# Patient Record
(demographics unavailable — no encounter records)

---

## 2025-03-31 NOTE — CONSULT LETTER
[Dear  ___] : Dear  [unfilled], [Consult Letter:] : I had the pleasure of evaluating your patient, [unfilled]. [Please see my note below.] : Please see my note below. [Consult Closing:] : Thank you very much for allowing me to participate in the care of this patient.  If you have any questions, please do not hesitate to contact me. [Sincerely,] : Sincerely, [FreeTextEntry2] : Dr Juan M Ferrell\par  [address]\par  [facility] [address]\par  tel: [phone] [FreeTextEntry3] : Ira Knutson MD, MS\par  Attending Physician, Pediatric Rheumatology\par

## 2025-03-31 NOTE — HISTORY OF PRESENT ILLNESS
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  XR at the time of the affected toe and finger did not reveal erosions. failed naprosyn x2 and underwent IAC injections with sedation to R 5th MCP, L2nd and 4th MTP, L 5 th MTP, bilateral knees in 11/2018. s/p IAC injections 7/2020 to R 2nd and 4th MTP, L 5th MTP.  Today they are here for follow-up appointment and full joint exam. No complaints and Anastasia has been very active - in dance and sports, not so much gymnastics this year. Fully participates in dance class year round. No joint pain or swelling. No morning stiffness.  Last saw ophth 8/2024- no uveitis. Next appointment with be with someone outside [facility] given difficulty with getting appointment.  No COVID exposure and no COVID symptoms. Mother is a teacher - teaching full in-person.  no URI symptoms and no COVID exposure. Dad continues to work from home.   No prolonged fevers, no rashes, no oral ulcers, no alopecia, no blood in urine/stool. growing well per mother from PCP.  Strong family history of autoimmunity including mother with Hashimoto, MGM with psoriatic arthritis, paternal uncle with psoriasis  Received COVID vaccine 12/2021 per mother.

## 2025-03-31 NOTE — PHYSICAL EXAM
[Acute distress] : no acute distress [Rash] : no rash [Malar Erythema] : no malar erythema [Nodules] : no nodules [Tightening] : no tightening [PERRLA] : HEIDY [Erythematous Conjunctiva] : nonerythematous conjunctiva [Eyelids] : normal eyelids [Erythematous Oropharynx] : nonerythematous oropharynx [Gums] : normal gums [Oral] : normal oral cavity  [Mucosa] : moist and pink mucosa [Palate] : normal palate [Ulcers] : no ulcers [Lesions] : no lesions [Induration] : no induration [Erythematous] : not erythematous [Mass (___cm)] : no neck masses [S1, S2 Present] : S1, S2 present [Murmurs] : no murmurs [Cardiac Auscultation] : normal cardiac auscultation  [Peripheral Pulses] : positive peripheral pulses [Peripheral Edema] : no peripheral edema  [Respiratory Effort] : normal respiratory effort [Clear to auscultation] : clear to auscultation [Soft] : soft [NonTender] : non tender [Non Distended] : non distended [Normal Bowel Sounds] : normal bowel sounds [No Hepatosplenomegaly] : no hepatosplenomegaly [No Abnormal Lymph Nodes Palpated] : no abnormal lymph nodes palpated [Refer to Joint Diagram Below] : refer to joint diagram below [Range Of Motion] : full range of motion [Joint effusions] : no joint effusions [Not Examined] : not examined [0] : 0 [FreeTextEntry1] : no acute distress, cooperative [de-identified] : ? nail pitting on bilateral halluci. hypertrophy R 5th finger and L 2 nd toe, R 4th MTP, knees full ROM today without swelling, no functional leg length discrepancy noted [de-identified] : Normal gait [de-identified] : none [de-identified] : full forward flexion

## 2025-03-31 NOTE — SOCIAL HISTORY
[Parent(s)] : parent(s) [Brother] : brother [] :  [Sexually Active] : patient is not sexually active [de-identified] : 4yo brother with tetralogy of Fallot

## 2025-03-31 NOTE — REVIEW OF SYSTEMS
[Change in Activity] : no change in activity [Fever] : no fever [Wgt Loss (___ Lbs)] : no recent weight loss [Malaise] : no malaise [Rash] : no rash [Insect Bites] : no insect bites [Skin Lesions] : no skin lesions [Eye Pain] : no eye pain [Redness] : no redness [Blurry Vision] : no blurred vision [Change in Vision] : no change in vision  [Nasal Stuffiness] : no nasal congestion [Earache] : no earache [Nosebleeds] : no epistaxis [Oral Ulcers] : no oral ulcers [Cyanosis] : no cyanosis [Edema] : no edema [Diaphoresis] : not diaphoretic [Chest Pain] : no chest pain or discomfort [Exercise Intolerance] : no exercise intolerance [Palpitations] : no palpitations [Tachypnea] : no tachypnea [Wheezing] : no wheezing [Cough] : no cough [Shortness of Breath] : no shortness of breath [Change in Appetite] : no change in appetite [Vomiting] : no vomiting [Diarrhea] : no diarrhea [Decrease In Appetite] : no decrease in appetite [Abdominal Pain] : no abdominal pain [Constipation] : no constipation [Dec Urine Output] : no oliguria [Pain During Urination] : no dysuria [Menarche] : no ~T menarche [Limping] : no limping [Joint Pains] : no arthralgias [Joint Swelling] : no joint swelling [Back Pain] : ~T no back pain [Muscle Aches] : no muscle aches [AM Stiffness] : no am stiffness [Fainting] : no fainting [Seizure] : no seizures [Headache] : no headache [Dizziness] : no dizziness [Appropriate Age Development] : development appropriate for age [Sleep Disturbances] : ~T no sleep disturbances [Hyperactive] : no hyperactive behavior [Emotional Problems] : no ~T emotional problems [Short Stature] : no short stature  [Cold Intolerance] : cold tolerant [Heat Intolerance] : heat tolerant [Bruising] : no tendency for easy bruising [Swollen Glands] : no lymphadenopathy [Frequent Infections] : no frequent infections [Seasonal Allergies] : no seasonal allergies [Immune Deficiencies] : no immune deficiencies [Smokers in Home] : no one in home smokes [Immunizations are up to date] : Immunizations are up to date [FreeTextEntry1] : records with PCP